# Patient Record
Sex: FEMALE | Race: WHITE | Employment: OTHER | URBAN - NONMETROPOLITAN AREA
[De-identification: names, ages, dates, MRNs, and addresses within clinical notes are randomized per-mention and may not be internally consistent; named-entity substitution may affect disease eponyms.]

---

## 2024-06-12 ENCOUNTER — EVALUATION (OUTPATIENT)
Dept: PHYSICAL THERAPY | Facility: CLINIC | Age: 49
End: 2024-06-12
Payer: COMMERCIAL

## 2024-06-12 DIAGNOSIS — M54.16 LUMBAR RADICULOPATHY: Primary | ICD-10-CM

## 2024-06-12 PROCEDURE — 97161 PT EVAL LOW COMPLEX 20 MIN: CPT | Performed by: PHYSICAL THERAPIST

## 2024-06-12 PROCEDURE — 97110 THERAPEUTIC EXERCISES: CPT | Performed by: PHYSICAL THERAPIST
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
## 2024-06-17 ENCOUNTER — OFFICE VISIT (OUTPATIENT)
Dept: PHYSICAL THERAPY | Facility: CLINIC | Age: 49
End: 2024-06-17
Payer: COMMERCIAL

## 2024-06-17 DIAGNOSIS — M54.16 LUMBAR RADICULOPATHY: Primary | ICD-10-CM

## 2024-06-17 PROCEDURE — 97110 THERAPEUTIC EXERCISES: CPT

## 2024-06-17 PROCEDURE — 97112 NEUROMUSCULAR REEDUCATION: CPT

## 2024-06-17 NOTE — PROGRESS NOTES
"Daily Note     Today's date: 2024  Patient name: Wendy Lagos  : 1975  MRN: [id number]  Referring provider: Deedee Herrmann CRNP  Dx:   Encounter Diagnosis     ICD-10-CM    1. Lumbar radiculopathy  M54.16           Start Time: 1015  Stop Time: 1100  Total time in clinic (min): 45 minutes    Subjective: I have some more pain today in my mid lower back.       Objective: See treatment diary below      Assessment: Tolerated treatment well. Patient would benefit from continued PT   we began some new exercises today with good tolerance. She reports that as she progressed with her exercises she had less pain in her right mid and low back.  She had some trouble with the PPT on the physio ball today. She did better after a few attempts., she reports feeling some fatigue post and less soreness.       Plan: Continue per plan of care.      Precautions: None      Date  IE       FOTO 63 SC       Manuals                                        Neuro Re-Ed        PPT on physioball  15  x 3\"       Multifidus hip hiking  2\" 10 x                                              Ther Ex        Body mechanics training 10 min       NuStep for strengthening   5min       CC press out seated  P1  10x       Standing   abd/ ext   20x each      Standing marching  20 x                              Ther Activity        Bridges  15 x 3\"               Gait Training                        Modalities                               "

## 2024-06-19 ENCOUNTER — OFFICE VISIT (OUTPATIENT)
Dept: PHYSICAL THERAPY | Facility: CLINIC | Age: 49
End: 2024-06-19
Payer: COMMERCIAL

## 2024-06-19 DIAGNOSIS — M54.16 LUMBAR RADICULOPATHY: Primary | ICD-10-CM

## 2024-06-19 PROCEDURE — 97530 THERAPEUTIC ACTIVITIES: CPT

## 2024-06-19 PROCEDURE — 97112 NEUROMUSCULAR REEDUCATION: CPT

## 2024-06-19 PROCEDURE — 97110 THERAPEUTIC EXERCISES: CPT

## 2024-06-19 NOTE — PROGRESS NOTES
"Daily Note     Today's date: 2024  Patient name: Wendy Lagos  : 1975  MRN: [id number]  Referring provider: Deedee Herrmann CRNP  Dx:   Encounter Diagnosis     ICD-10-CM    1. Lumbar radiculopathy  M54.16           Start Time: 1100  Stop Time: 1158  Total time in clinic (min): 58 minutes    Subjective: My back is feeling better.  I have some mild tightness in my low back.       Objective: See treatment diary below      Assessment: Tolerated treatment well. Patient would benefit from continued PT   pt came into therapy today with reports of very little pain. We were able to increase reps for some exercises and added a few new ones. She reports having some mild pain with bridges lifting up. Over all, she had good tolerance with the exercises today and feels she is making progress.  We will continue to work on her strength and decrease pain.  Pt took two laps around the clinic post exercise and states that she felt better over all and also felt better when she walked a bit faster.       Plan: Continue per plan of care.      Precautions: None      Date  IE      FOTO 63 SC       Manuals        Piriformis   JF                              Neuro Re-Ed        PPT on physioball  15  x 3\"  20x 3\"     Multifidus hip hiking  2\" 10 x 2\" 10 x     PPT with march   10 x each                                     Ther Ex        Body mechanics training 10 min       NuStep for strengthening   5min  7 min L 5     CC press out seated  P1  10x  P 1 15 x  stand     Standing   abd/ ext   20x each 30 x each     Standing marching  20 x 20 x 2     Step ups   8\" 15 x                      Ther Activity        Bridges  15 x 3\"  20 x 3\"              Gait Training                        Modalities                                 "

## 2024-06-24 ENCOUNTER — OFFICE VISIT (OUTPATIENT)
Dept: PHYSICAL THERAPY | Facility: CLINIC | Age: 49
End: 2024-06-24
Payer: COMMERCIAL

## 2024-06-24 DIAGNOSIS — M54.16 LUMBAR RADICULOPATHY: Primary | ICD-10-CM

## 2024-06-24 PROCEDURE — 97112 NEUROMUSCULAR REEDUCATION: CPT

## 2024-06-24 PROCEDURE — 97530 THERAPEUTIC ACTIVITIES: CPT

## 2024-06-24 PROCEDURE — 97110 THERAPEUTIC EXERCISES: CPT

## 2024-06-24 NOTE — PROGRESS NOTES
"Daily Note     Today's date: 2024  Patient name: Wendy Lagos  : 1975  MRN: [id number]  Referring provider: Deedee Herrmann CRNP  Dx:   Encounter Diagnosis     ICD-10-CM    1. Lumbar radiculopathy  M54.16           Start Time: 1100  Stop Time: 1155  Total time in clinic (min): 55 minutes    Subjective: I did a lot of driving over the weekend and I did well.  My pain today is over the mid back over my right side.       Objective: See treatment diary below      Assessment: Tolerated treatment well. Patient would benefit from continued PT  pt began her treatment today with some mild pain in her right mid back. She had some difficulty with stand press outs , but, completed with no increase in pain.  She was able to increase reps for some exercises today with good tolerance. She states having less pain post therapy today., she feels the exercises are helping and over all is having less pain.,       Plan: Continue per plan of care.      Precautions: None      Date  IE     FOTO 63 SC       Manuals        Piriformis   JF  JF                            Neuro Re-Ed        PPT on physioball  15  x 3\"  20x 3\" 20 x 3\"     Multifidus hip hiking  2\" 10 x 2\" 10 x 2\" 15x    PPT with march   10 x each 10 x                                    Ther Ex        Body mechanics training 10 min       NuStep for strengthening   5min  7 min L 5 7 min L 5    CC press out seated  P1  10x  P 1 15 x  stand P 1 15 x stand    Standing   abd/ ext   20x each 30 x each 30 x    Standing marching  20 x 20 x 2 20 x 2    Step ups   8\" 15 x  8\" 20 x    Cat camel    NV    UBE  stand    NV     Ther Activity        Bridges  15 x 3\"  20 x 3\"  20 x 3\"             Gait Training                        Modalities                                   "

## 2024-06-25 ENCOUNTER — APPOINTMENT (OUTPATIENT)
Dept: PHYSICAL THERAPY | Facility: CLINIC | Age: 49
End: 2024-06-25
Payer: COMMERCIAL

## 2024-06-26 ENCOUNTER — OFFICE VISIT (OUTPATIENT)
Dept: PHYSICAL THERAPY | Facility: CLINIC | Age: 49
End: 2024-06-26
Payer: COMMERCIAL

## 2024-06-26 ENCOUNTER — APPOINTMENT (OUTPATIENT)
Dept: PHYSICAL THERAPY | Facility: CLINIC | Age: 49
End: 2024-06-26
Payer: COMMERCIAL

## 2024-06-26 DIAGNOSIS — M54.16 LUMBAR RADICULOPATHY: Primary | ICD-10-CM

## 2024-06-26 PROCEDURE — 97112 NEUROMUSCULAR REEDUCATION: CPT

## 2024-06-26 PROCEDURE — 97530 THERAPEUTIC ACTIVITIES: CPT

## 2024-06-26 PROCEDURE — 97110 THERAPEUTIC EXERCISES: CPT

## 2024-06-26 NOTE — PROGRESS NOTES
"Daily Note     Today's date: 2024  Patient name: Wendy Lagos  : 1975  MRN: [id number]  Referring provider: Deedee Herrmann CRNP  Dx:   Encounter Diagnosis     ICD-10-CM    1. Lumbar radiculopathy  M54.16           Start Time: 1630  Stop Time: 1715  Total time in clinic (min): 45 minutes    Subjective: Pt reports min LBP today.      Objective: See treatment diary below      Assessment: Tolerated treatment well. Patient would benefit from continued PT. PTA added  UBE standing, cat/camel. Absent c/o with same.      Plan: Continue per plan of care.      Precautions: None      Date  IE    FOTO 63 SC       Manuals        Piriformis   JF  JF NV                           Neuro Re-Ed        PPT on physioball  15  x 3\"  20x 3\" 20 x 3\"  20x 3\"   Multifidus hip hiking  2\" 10 x 2\" 10 x 2\" 15x 15x 2\"   PPT with march   10 x each 10 x 15x                            Ther Ex        Body mechanics training 10 min       NuStep for strengthening   5min  7 min L 5 7 min L 5 8m L5   CC press out   P1  10x  P 1 15 x  stand P 1 15 x stand P1 15x 3\"   Standing   abd/ ext   20x each 30 x each 30 x NV   Standing marching  20 x 20 x 2 20 x 2 NV   Step ups   8\" 15 x  8\" 20 x 8\"   Cat camel    NV 10x    UBE  stand    NV  4m F/R L1   Ther Activity        Bridges  15 x 3\"  20 x 3\"  20 x 3\"  20x 3\"           Gait Training                Modalities                                     "

## 2024-07-02 ENCOUNTER — OFFICE VISIT (OUTPATIENT)
Dept: PHYSICAL THERAPY | Facility: CLINIC | Age: 49
End: 2024-07-02
Payer: COMMERCIAL

## 2024-07-02 DIAGNOSIS — M54.16 LUMBAR RADICULOPATHY: Primary | ICD-10-CM

## 2024-07-02 PROCEDURE — 97530 THERAPEUTIC ACTIVITIES: CPT

## 2024-07-02 PROCEDURE — 97110 THERAPEUTIC EXERCISES: CPT

## 2024-07-02 PROCEDURE — 97112 NEUROMUSCULAR REEDUCATION: CPT

## 2024-07-02 NOTE — PROGRESS NOTES
"=  Daily Note     Today's date: 2024  Patient name: Wendy Lagos  : 1975  MRN: [id number]  Referring provider: Deedee Herrmann CRNP  Dx:   Encounter Diagnosis     ICD-10-CM    1. Lumbar radiculopathy  M54.16           Start Time: 1100  Stop Time: 1145  Total time in clinic (min): 45 minutes    Subjective: Pt comments on absent lumbar pain.      Objective: See treatment diary below. FOTO 69, increased from 63.      Assessment: Tolerated treatment well. Patient exhibited good technique with therapeutic exercises      Plan: Continue per plan of care.      Precautions: None      Date    FOTO Ds 69       Manuals        Piriformis NP  JF  JF NV                   Neuro Re-Ed        PPT on physioball 20x 3\" 15  x 3\"  20x 3\" 20 x 3\"  20x 3\"   Multifidus hip hiking 15x 2\" 2\" 10 x 2\" 10 x 2\" 15x 15x 2\"   PPT with march 15x   10 x each 10 x 15x    Tandem walk-foam 5 laps                       Ther Ex        NuStep for strengthening 5m L5  5min  7 min L 5 7 min L 5 8m L5   CC press out  P2  15x 3\" P1  10x  P 1 15 x  stand P 1 15 x stand P1 15x 3\"   Standing   abd/ ext  2# 20x ea 20x each 30 x each 30 x NV   Standing marching NP 20 x 20 x 2 20 x 2 NV   Step ups 8\" 20x  8\" 15 x  8\" 20 x 8\"   Cat camel 10x   NV 10x    UBE  stand 4m L2   NV  4m F/R L1   Ther Activity        Bridges 20x 5\" 15 x 3\"  20 x 3\"  20 x 3\"  20x 3\"           Gait Training                Modalities                                       "

## 2024-07-08 ENCOUNTER — OFFICE VISIT (OUTPATIENT)
Dept: PHYSICAL THERAPY | Facility: CLINIC | Age: 49
End: 2024-07-08
Payer: COMMERCIAL

## 2024-07-08 DIAGNOSIS — M54.16 LUMBAR RADICULOPATHY: Primary | ICD-10-CM

## 2024-07-08 PROCEDURE — 97530 THERAPEUTIC ACTIVITIES: CPT

## 2024-07-08 PROCEDURE — 97112 NEUROMUSCULAR REEDUCATION: CPT

## 2024-07-08 PROCEDURE — 97110 THERAPEUTIC EXERCISES: CPT

## 2024-07-08 NOTE — PROGRESS NOTES
"Daily Note     Today's date: 2024  Patient name: Wendy Lagos  : 1975  MRN: [id number]  Referring provider: Deedee Herrmann CRNP  Dx:   Encounter Diagnosis     ICD-10-CM    1. Lumbar radiculopathy  M54.16           Start Time: 1100  Stop Time: 1145  Total time in clinic (min): 45 minutes    Subjective: My back is sore today,. I was sitting a lot over the weekend and was in the car a lot.       Objective: See treatment diary below      Assessment: Tolerated treatment well. Patient would benefit from continued PT  pt comes into therapy today with some mild soreness and tightness in her low back. She reports that she was riding in the car a lot over the weekend.  She was able to complete her full program with some lessening of tightness and pain. She needs verbal cues through out to perform the exercises correctly.  Pt will be back for one more visit and will be away for a few weeks.  Pt was told to continue to work on her exercises at home. We will review next visit.       Plan: Continue per plan of care.      Precautions: None      Date    FOTO Ds 69       Manuals        Piriformis NP JF JF  JF NV                   Neuro Re-Ed        PPT on physioball 20x 3\" 20   x 3\"  20x 3\" 20 x 3\"  20x 3\"   Multifidus hip hiking 15x 2\" 2\" 15 x  2\" 10 x 2\" 15x 15x 2\"   PPT with march 15x  15 x  10 x each 10 x 15x    Tandem walk-foam 5 laps 5laps                      Ther Ex        NuStep for strengthening 5m L5  5min  7 min L 5 7 min L 5 8m L5   CC press out  P2  15x 3\" P2  15x P 1 15 x  stand P 1 15 x stand P1 15x 3\"   Standing   abd/ ext  2# 20x ea 20x each 30 x each 30 x NV   Standing marching NP  20 x 2 20 x 2 NV   Step ups 8\" 20x 8\" 20 x 8\" 15 x  8\" 20 x 8\"   Cat camel 10x 10 x  NV 10x    UBE  stand 4m L2   NV  4m F/R L1   Ther Activity        Bridges 20x 5\" 15 x 3\"  20 x 3\"  20 x 3\"  20x 3\"           Gait Training                Modalities                                         "

## 2024-07-10 ENCOUNTER — OFFICE VISIT (OUTPATIENT)
Dept: PHYSICAL THERAPY | Facility: CLINIC | Age: 49
End: 2024-07-10
Payer: COMMERCIAL

## 2024-07-10 DIAGNOSIS — M54.16 LUMBAR RADICULOPATHY: Primary | ICD-10-CM

## 2024-07-10 PROCEDURE — 97530 THERAPEUTIC ACTIVITIES: CPT

## 2024-07-10 PROCEDURE — 97110 THERAPEUTIC EXERCISES: CPT

## 2024-07-10 PROCEDURE — 97112 NEUROMUSCULAR REEDUCATION: CPT

## 2024-07-10 NOTE — PROGRESS NOTES
"Daily Note     Today's date: 7/10/2024  Patient name: Wendy Lagos  : 1975  MRN: [id number]  Referring provider: Deedee Herrmann CRNP  Dx:   Encounter Diagnosis     ICD-10-CM    1. Lumbar radiculopathy  M54.16                      Subjective: Pt with min c/o.      Objective: See treatment diary below      Assessment: Tolerated treatment well. Patient exhibited good technique with therapeutic exercises      Plan: Continue per plan of care.      Precautions: None      Date 7/2 7/8 7/10 6/24 6 /26   FOTO Ds 69       Manuals        Piriformis NP JF ds JF NV                   Neuro Re-Ed        PPT on physioball 20x 3\" 20   x 3\"  20x 3\" 20 x 3\"  20x 3\"   Multifidus hip hiking 15x 2\" 2\" 15 x  2\" 10 x 2\" 15x 15x 2\"   PPT with march 15x  15 x  15x ea 10 x 15x    Tandem walk-foam 5 laps 5laps 5 laps                     Ther Ex        NuStep for strengthening 5m L5  5min  8m L5 7 min L 5 8m L5   CC press out  P2  15x 3\" P2  15x P2 15x P 1 15 x stand P1 15x 3\"   Standing   abd/ ext  2# 20x ea 20x each 20x ea 30 x NV   Standing marching NP  20x  20 x 2 NV   Step ups 8\" 20x 8\" 20 x 8\" 20x 8\" 20 x 8\"   Cat camel 10x 10 x 10x  NV 10x    UBE  stand 4m L2  4m L2 NV  4m F/R L1   Ther Activity        Bridges 20x 5\" 15 x 3\"  20 x 3\"  20 x 3\"  20x 3\"           Gait Training                Modalities                                           "

## 2024-07-15 ENCOUNTER — APPOINTMENT (OUTPATIENT)
Dept: PHYSICAL THERAPY | Facility: CLINIC | Age: 49
End: 2024-07-15
Payer: COMMERCIAL

## 2024-07-18 ENCOUNTER — APPOINTMENT (OUTPATIENT)
Dept: PHYSICAL THERAPY | Facility: CLINIC | Age: 49
End: 2024-07-18
Payer: COMMERCIAL